# Patient Record
Sex: MALE | Race: BLACK OR AFRICAN AMERICAN | NOT HISPANIC OR LATINO | Employment: FULL TIME | URBAN - METROPOLITAN AREA
[De-identification: names, ages, dates, MRNs, and addresses within clinical notes are randomized per-mention and may not be internally consistent; named-entity substitution may affect disease eponyms.]

---

## 2024-02-10 PROBLEM — F11.24 OPIOID-INDUCED DEPRESSIVE DISORDER WITH MODERATE OR SEVERE USE DISORDER: Status: ACTIVE | Noted: 2024-02-10

## 2024-02-27 ENCOUNTER — HOSPITAL ENCOUNTER (EMERGENCY)
Facility: HOSPITAL | Age: 34
Discharge: HOME | End: 2024-02-27
Attending: SURGERY
Payer: MEDICAID

## 2024-02-27 VITALS
WEIGHT: 183.31 LBS | HEIGHT: 69 IN | DIASTOLIC BLOOD PRESSURE: 98 MMHG | OXYGEN SATURATION: 97 % | SYSTOLIC BLOOD PRESSURE: 141 MMHG | RESPIRATION RATE: 18 BRPM | TEMPERATURE: 99 F | BODY MASS INDEX: 27.15 KG/M2 | HEART RATE: 99 BPM

## 2024-02-27 DIAGNOSIS — S62.511A CLOSED DISPLACED FRACTURE OF PROXIMAL PHALANX OF RIGHT THUMB, INITIAL ENCOUNTER: Primary | ICD-10-CM

## 2024-02-27 PROCEDURE — 63600175 PHARM REV CODE 636 W HCPCS: Mod: JZ,TB | Performed by: SURGERY

## 2024-02-27 PROCEDURE — 96372 THER/PROPH/DIAG INJ SC/IM: CPT | Performed by: SURGERY

## 2024-02-27 PROCEDURE — 99284 EMERGENCY DEPT VISIT MOD MDM: CPT | Mod: 25

## 2024-02-27 RX ORDER — ONDANSETRON HYDROCHLORIDE 2 MG/ML
4 INJECTION, SOLUTION INTRAVENOUS
Status: COMPLETED | OUTPATIENT
Start: 2024-02-27 | End: 2024-02-27

## 2024-02-27 RX ORDER — MORPHINE SULFATE 2 MG/ML
4 INJECTION, SOLUTION INTRAMUSCULAR; INTRAVENOUS
Status: COMPLETED | OUTPATIENT
Start: 2024-02-27 | End: 2024-02-27

## 2024-02-27 RX ORDER — HYDROCODONE BITARTRATE AND ACETAMINOPHEN 5; 325 MG/1; MG/1
1 TABLET ORAL EVERY 4 HOURS PRN
Qty: 10 TABLET | Refills: 0 | Status: SHIPPED | OUTPATIENT
Start: 2024-02-27 | End: 2024-02-29

## 2024-02-27 RX ORDER — HYDROCODONE BITARTRATE AND ACETAMINOPHEN 5; 325 MG/1; MG/1
1 TABLET ORAL EVERY 4 HOURS PRN
Qty: 10 TABLET | Refills: 0 | Status: SHIPPED | OUTPATIENT
Start: 2024-02-27 | End: 2024-02-27

## 2024-02-27 RX ADMIN — ONDANSETRON 4 MG: 2 INJECTION INTRAMUSCULAR; INTRAVENOUS at 09:02

## 2024-02-27 RX ADMIN — MORPHINE SULFATE 4 MG: 2 INJECTION, SOLUTION INTRAMUSCULAR; INTRAVENOUS at 09:02

## 2024-02-28 NOTE — ED NOTES
Discussed the importance of returning with any new, worsening, or unrelenting symptoms. Counseled on importance of following up with PCP and ortho and taking medications as directed. Patient understands and agrees with plan. All questions and concerns addressed. Rx's sent to pharmacy. No questions or concerns voiced.

## 2024-02-28 NOTE — ED PROVIDER NOTES
Encounter Date: 2/27/2024       History     Chief Complaint   Patient presents with    Hand Injury     PT TO ER WITH C/O RIGHT HAND PAIN AND SWELLING X 2 DAYS. PT STATES HE WAS BOXING WITH BOXING GLOVES ON AND INJURED HIMSELF     History of Present Illness  Lavinia Figueredo is a 34 y.o. 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     Review of patient's allergies indicates:  No Known Allergies    History reviewed. No pertinent past medical history.  No past surgical history on file.  No family history on file.  Social History     Tobacco Use    Smoking status: Every Day     Current packs/day: 1.00     Types: Cigarettes   Substance Use Topics    Alcohol use: No    Drug use: No     Review of Systems   Constitutional:  Negative for fever.   HENT:  Negative for sore throat.    Respiratory:  Negative for shortness of breath.    Cardiovascular:  Negative for chest pain.   Gastrointestinal:  Negative for nausea.   Genitourinary:  Negative for dysuria.   Musculoskeletal:  Negative for back pain.        (+) right thumb pain   Skin:  Negative for rash.   Neurological:  Negative for weakness.   Hematological:  Does not bruise/bleed easily.       Physical Exam     Initial Vitals [02/27/24 2110]   BP Pulse Resp Temp SpO2   (!) 161/101 (!) 117 18 99.2 °F (37.3 °C) 96 %      MAP       --         Physical Exam    Nursing note and vitals reviewed.  Constitutional: Vital signs are normal. He appears well-developed and well-nourished. He is cooperative.   HENT:   Head: Normocephalic and atraumatic.   Mouth/Throat: Uvula is midline and mucous membranes are normal.   Eyes: Conjunctivae, EOM and lids are normal. Pupils are equal, round, and reactive to  light.   Neck: Neck supple.   Normal range of motion.   Full passive range of motion without pain.     Cardiovascular:  Normal rate, regular rhythm, S1 normal, S2 normal, normal heart sounds, intact distal pulses and normal pulses.           Pulmonary/Chest: Effort normal and breath sounds normal.   Abdominal: Abdomen is soft and flat. Bowel sounds are normal.   Musculoskeletal:      Cervical back: Full passive range of motion without pain, normal range of motion and neck supple.      Comments:   (+) tenderness & swelling at the base of the right thumb     Neurological: He is alert and oriented to person, place, and time. He has normal strength.   Skin: Skin is warm, dry and intact. Capillary refill takes less than 2 seconds.         ED Course   Procedures  Labs Reviewed - No data to display       Imaging Results              X-Ray Hand 3 view Right (Final result)  Result time 02/27/24 21:23:03      Final result by Maya Garrett MD (02/27/24 21:23:03)                   Impression:      As above.      Electronically signed by: Maya Garrett  Date:    02/27/2024  Time:    21:23               Narrative:    EXAMINATION:  XR HAND COMPLETE 3 VIEW RIGHT    CLINICAL HISTORY:  Right hand pain;    TECHNIQUE:  PA, lateral, and oblique views of the right hand were performed.    COMPARISON:  None    FINDINGS:  Acute moderately displaced fracture of the thumb metacarpal.  Obliquely oriented fracture line extends from the mid diaphysis to the neck.  Padroni radial angulation.  No dislocation.  Soft tissue swelling.                                       Medications   morphine injection 4 mg (4 mg Intramuscular Given 2/27/24 2154)   ondansetron injection 4 mg (4 mg Intramuscular Given 2/27/24 2154)     Medical Decision Making  34-year-old male with right thumb pain after accidental injury boxing this week  Differential includes sprain, strain, fracture, dislocation, ligament/tendon injury    Problems Addressed:  Closed  displaced fracture of proximal phalanx of right thumb, initial encounter: complicated acute illness or injury    Amount and/or Complexity of Data Reviewed  Radiology: ordered and independent interpretation performed.    ED Management & Risks of Complication, Morbidity, & Mortality:  Obvious right thumb fracture, grossly displaced on x-ray today  Attempted reduction, the patient refusing reduction of the fracture  Patient was actually refusing any other orthopedic intervention  Thumb spica given for immobilization, patient needs reduction  STATES HE DOES NOT WANT ANY MANIPULATION OF THUMB  PATIENT STATES HE IS GOING TO [facility]  PATIENT HAS NO LOCAL ACCESS TO ORTHO AS OUTPATIENT  UNDERSTANDS HE NEEDS ORTHOPEDIC REDUCTION ASAP    Clinical Impression:  Final diagnoses:  [S62.511A] Closed displaced fracture of proximal phalanx of right thumb, initial encounter (Primary)          ED Disposition Condition    Discharge Stable          ED Prescriptions       Medication Sig Dispense Start Date End Date Auth. Provider    HYDROcodone-acetaminophen (NORCO) 5-325 mg per tablet Take 1 tablet by mouth every 4 (four) hours as needed for Pain. 10 tablet 2/27/2024 2/29/2024 Eddie Wei MD          Follow-up Information       Follow up With Specialties Details Why Contact [facility]  Go in 1 day  [address]  [facility] [id number]             Eddie Wei MD  02/27/24 [id number]